# Patient Record
Sex: FEMALE | Race: ASIAN
[De-identification: names, ages, dates, MRNs, and addresses within clinical notes are randomized per-mention and may not be internally consistent; named-entity substitution may affect disease eponyms.]

---

## 2018-06-13 ENCOUNTER — HOSPITAL ENCOUNTER (OUTPATIENT)
Dept: HOSPITAL 74 - JASU-SURG | Age: 68
Discharge: HOME | End: 2018-06-13
Attending: OPHTHALMOLOGY
Payer: COMMERCIAL

## 2018-06-13 VITALS — TEMPERATURE: 98.5 F | DIASTOLIC BLOOD PRESSURE: 79 MMHG | SYSTOLIC BLOOD PRESSURE: 141 MMHG | HEART RATE: 65 BPM

## 2018-06-13 VITALS — BODY MASS INDEX: 29.9 KG/M2

## 2018-06-13 DIAGNOSIS — H26.9: Primary | ICD-10-CM

## 2018-06-13 PROCEDURE — 08RK3JZ REPLACEMENT OF LEFT LENS WITH SYNTHETIC SUBSTITUTE, PERCUTANEOUS APPROACH: ICD-10-PCS | Performed by: OPHTHALMOLOGY

## 2018-06-13 RX ADMIN — PHENYLEPHRINE HYDROCHLORIDE SCH DROP: 0.25 SPRAY NASAL at 09:15

## 2018-06-13 RX ADMIN — FLURBIPROFEN SODIUM SCH DROP: 0.3 SOLUTION/ DROPS OPHTHALMIC at 09:10

## 2018-06-13 RX ADMIN — FLURBIPROFEN SODIUM SCH DROP: 0.3 SOLUTION/ DROPS OPHTHALMIC at 09:15

## 2018-06-13 RX ADMIN — CYCLOPENTOLATE HYDROCHLORIDE SCH DROP: 10 SOLUTION/ DROPS OPHTHALMIC at 09:20

## 2018-06-13 RX ADMIN — OFLOXACIN SCH DROP: 3 SOLUTION/ DROPS OPHTHALMIC at 09:15

## 2018-06-13 RX ADMIN — FLURBIPROFEN SODIUM SCH DROP: 0.3 SOLUTION/ DROPS OPHTHALMIC at 09:20

## 2018-06-13 RX ADMIN — CYCLOPENTOLATE HYDROCHLORIDE SCH DROP: 10 SOLUTION/ DROPS OPHTHALMIC at 09:15

## 2018-06-13 RX ADMIN — PHENYLEPHRINE HYDROCHLORIDE SCH DROP: 0.25 SPRAY NASAL at 09:20

## 2018-06-13 RX ADMIN — TROPICAMIDE SCH DROP: 10 SOLUTION/ DROPS OPHTHALMIC at 09:15

## 2018-06-13 RX ADMIN — CYCLOPENTOLATE HYDROCHLORIDE SCH DROP: 10 SOLUTION/ DROPS OPHTHALMIC at 09:10

## 2018-06-13 RX ADMIN — TROPICAMIDE SCH DROP: 10 SOLUTION/ DROPS OPHTHALMIC at 09:10

## 2018-06-13 RX ADMIN — OFLOXACIN SCH DROP: 3 SOLUTION/ DROPS OPHTHALMIC at 09:20

## 2018-06-13 RX ADMIN — OFLOXACIN SCH DROP: 3 SOLUTION/ DROPS OPHTHALMIC at 09:10

## 2018-06-13 RX ADMIN — PHENYLEPHRINE HYDROCHLORIDE SCH DROP: 0.25 SPRAY NASAL at 09:10

## 2018-06-13 RX ADMIN — TROPICAMIDE SCH DROP: 10 SOLUTION/ DROPS OPHTHALMIC at 09:20

## 2018-06-13 NOTE — SPEC
DATE OF OPERATION:  06/13/2018

 

PREOPERATIVE DIAGNOSIS:  Cataract, left eye.

 

POSTOPERATIVE DIAGNOSIS:  Cataract, left eye.

 

PROCEDURE:  Phacoemulsification of left cataract with posterior chamber intraocular

lens implantation.  Lens used [id number], 15.0 diopter power, serial No. [id number]. 

 

SURGEON:  Jaylyn Chauhan M.D.

 

ANESTHESIA:  Topical MAC.

 

COMPLICATIONS:  None.

 

DESCRIPTION OF PROCEDURE:  The patient was brought to the operating room and

correctly identified along with the operative site and the correct intraocular lens

mcdaniel.  The patient was then prepped and draped in the usual sterile fashion

including 5% Betadine solution in the conjunctival sac and an eyelid drape.  An

eyelid speculum was then placed in the eye.  A paracentesis port was created and

approximately 0.5 mL of preservative free Lidocaine was then injected into the eye. 

Viscoelastic was then injected to inflate the anterior chamber.

 

A temporal clear corneal wound was created.  A continuous circular capsulorrhexis was

performed.   The nucleus was then hydrodissected with BSS and removed with

phacoemulsification.  The remaining cortical material was irrigated and aspirated. 

Viscoelastic was injected to inflate the capsular bag and the intraocular lens was

then implanted into the capsular bag.  The remaining Viscoelastic was irrigated and

aspirated from the eye.  The IOL was noted to be well centered and completely covered

by the anterior capsulorrhexis.  Topical vancomycin was placed and the eye patched

and shielded.  All wounds were tested and found to be watertight.  No suture was

placed.  The eye was then shielded.  The patient was then discharged from the

operating room in stable condition.

 

 

JAYLYN CHAUHAN M.D.

 

[id number]

DD: 06/13/2018 10:35

DT: 06/13/2018 10:59

Job #:  [id number]

## 2024-07-08 ENCOUNTER — OFFICE (OUTPATIENT)
Dept: URBAN - METROPOLITAN AREA CLINIC 30 | Facility: CLINIC | Age: 74
Setting detail: OPHTHALMOLOGY
End: 2024-07-08
Payer: COMMERCIAL

## 2024-07-08 DIAGNOSIS — H43.813: ICD-10-CM

## 2024-07-08 DIAGNOSIS — H47.233: ICD-10-CM

## 2024-07-08 DIAGNOSIS — H40.1232: ICD-10-CM

## 2024-07-08 DIAGNOSIS — H52.4: ICD-10-CM

## 2024-07-08 DIAGNOSIS — Z96.1: ICD-10-CM

## 2024-07-08 PROBLEM — H47.20 OPTIC ATROPHY UNSPECIFIED: Status: ACTIVE | Noted: 2024-07-08

## 2024-07-08 PROCEDURE — 92020 GONIOSCOPY: CPT | Performed by: OPHTHALMOLOGY

## 2024-07-08 PROCEDURE — 92201 OPSCPY EXTND RTA DRAW UNI/BI: CPT | Performed by: OPHTHALMOLOGY

## 2024-07-08 PROCEDURE — 92250 FUNDUS PHOTOGRAPHY W/I&R: CPT | Performed by: OPHTHALMOLOGY

## 2024-07-08 PROCEDURE — 92015 DETERMINE REFRACTIVE STATE: CPT | Performed by: OPHTHALMOLOGY

## 2024-07-08 PROCEDURE — 92004 COMPRE OPH EXAM NEW PT 1/>: CPT | Performed by: OPHTHALMOLOGY

## 2024-07-08 ASSESSMENT — CONFRONTATIONAL VISUAL FIELD TEST (CVF)
OS_FINDINGS: FULL
OD_FINDINGS: FULL